# Patient Record
Sex: MALE | Race: WHITE
[De-identification: names, ages, dates, MRNs, and addresses within clinical notes are randomized per-mention and may not be internally consistent; named-entity substitution may affect disease eponyms.]

---

## 2017-12-04 ENCOUNTER — HOSPITAL ENCOUNTER (EMERGENCY)
Dept: HOSPITAL 80 - FED | Age: 27
Discharge: LEFT BEFORE DISCHARGE | End: 2017-12-04
Payer: SELF-PAY

## 2017-12-04 VITALS
HEART RATE: 78 BPM | OXYGEN SATURATION: 94 % | SYSTOLIC BLOOD PRESSURE: 158 MMHG | TEMPERATURE: 98.1 F | RESPIRATION RATE: 16 BRPM | DIASTOLIC BLOOD PRESSURE: 106 MMHG

## 2017-12-04 DIAGNOSIS — Z53.21: Primary | ICD-10-CM

## 2018-09-05 ENCOUNTER — HOSPITAL ENCOUNTER (EMERGENCY)
Dept: HOSPITAL 80 - FED | Age: 28
Discharge: HOME | End: 2018-09-05
Payer: COMMERCIAL

## 2018-09-05 VITALS — SYSTOLIC BLOOD PRESSURE: 129 MMHG | DIASTOLIC BLOOD PRESSURE: 88 MMHG

## 2018-09-05 DIAGNOSIS — F41.1: ICD-10-CM

## 2018-09-05 DIAGNOSIS — R11.10: Primary | ICD-10-CM

## 2018-09-05 DIAGNOSIS — F17.210: ICD-10-CM

## 2018-09-05 DIAGNOSIS — E86.9: ICD-10-CM

## 2018-09-05 LAB — PLATELET # BLD: 383 10^3/UL (ref 150–400)

## 2018-09-05 NOTE — EDPHY
General





- History


Smoking Status: Current every day smoker


Time Seen by Provider: 09/05/18 17:53


Narrative: 





CHIEF COMPLAINT: 


Body aches, anxious, vomiting





HISTORY OF PRESENT ILLNESS: 


Patient presents by private vehicle with complaints of body aches, anxious, 

vomiting.  Symptoms started 2 days ago.  He describes body aches and pain in 

"everyone single joint on my body."He feels very tired and has if he has no 

energy.  No chest pain.  Dry, nonproductive cough.  No headache.  No neck pain 

or stiffness.  No rash.  Also so she had with runny nose, sinus congestion.  He 

does have 5-7 episodes of vomiting day, no pain.  No bloody emesis.  No 

constipation, diarrhea or dark tarry stools.  No bright red blood in the 

stools.  No recent travel or known sick contacts.  Minimal improvement with over

-the-counter medications.  Worse with intake by mouth.  No other associated 

complaints or modifying factors





REVIEW OF SYSTEMS:


10 systems were reviewed and negative with the exception of the elements 

mentioned in the history of present illness.





PCP:


None





SPECIALISTS:


None





PAST MEDICAL HISTORY: 


Anxiety





PAST SURGICAL HISTORY:


No recent surgeries





SOCIAL HISTORY:


Daily smoker.  Occasional alcohol use.  Recently moved from [place].





FAMILY HISTORY:


Noncontributory





EXAMINATION:


General Appearance:  Alert, no distress


Head: normocephalic, atraumatic


Eyes:  Pupils equal and round, no conjunctival pallor or injection


ENT, Mouth:  Mucous membranes slightly dry.  Airway is widely patent with no 

trismus.  The posterior pharynx is unremarkable without exudate.


Neck:  Normal inspection, supple, non-tender


Respiratory:  Mild rhonchi.  No wheezing.  No retractions.  No crackles or 

diminishment.


Cardiovascular:  Regular rate and rhythm


Gastrointestinal:  Abdomen is soft and nontender.  No tympany rigidity.  No 

guarding.  No distention.


Back: non-tender, no bony abnormalities


Neurological:  A&O, nonfocal, normal gait


Skin:  Warm and dry, no rash no petechiae or purpura


Extremities:  Nontender, no pedal edema


Psychiatric:  Mood and affect normal





DIFFERENTIAL DIAGNOSES:


Including but not limited to acute anxiety reaction, dehydration, gastritis, 

pancreatitis, cholecystitis, cholelithiasis,








MDM:


6:00 p.m.


Nausea, vomiting, joint pain, body aches, accompanied by dry, nonproductive 

cough and anxiety.  Patient has mild tachycardic.  He is afebrile.  He is 

normotensive and does not appear to be toxic or acutely ill.  I have ordered IV 

fluid for the tachycardia, flu swab and laboratory studies further vomiting.  

He has a benign abdominal examination.  No meningismus.  Resting comfortably in 

no acute distress.





6:45 p.m.


Laboratories thus far negative.  IV fluid infusing.  Attempt p.o. Trial.





7:15 p.m.


Patient re-evaluated.  His vital signs are within normal limits.  He is feeling 

much better after IV fluid.  We discussed discharge home with hydroxyzine for 

anxiolysis.  We also discussed Zofran for nausea.  We discussed increase fluid 

intake and follow up with the on-call primary care physician to establish care.

  I have also provided the mental [facility] information for outpatient 

care for his anxiety.  We discussed ED precautions for any worsening symptoms, 

thoughts of self-harm, thoughts of harm towards others, chest pain or shortness 

of breath.  Discharged home stable condition.





SUPERVISION:


This patient was independently evaluated without direct involvement of or 

examination by the attending physician. 





CONSULTATION:


None


Primary care, [facility] referral


 (Vicente Orozco)


Discussion: 





The patient was evaluated and managed by the Physician Assistant.  My co-

signature indicates that I have reviewed this chart and I agree with the 

findings and plan of care as documented.  I am the secondary supervising 

physician. (Afsaneh Castro)





- Objective


Vital Signs: 





 Initial Vital Signs











Temperature (C)  36.8 C   09/05/18 16:50


 


Heart Rate  109 H  09/05/18 16:50


 


Respiratory Rate  16   09/05/18 16:50


 


Blood Pressure  150/106 H  09/05/18 16:50


 


O2 Sat (%)  99   09/05/18 16:50








 











O2 Delivery Mode               Room Air














Allergies/Adverse Reactions: 


 





NSAIDS Allergy (Intermediate, Uncoded 09/05/18 16:49)


 lips and genital swells








Home Medications: 














 Medication  Instructions  Recorded


 


Ondansetron Odt [Zofran Odt 4 mg 4 mg PO Q6 PRN #12 tab 09/05/18





(*)]  


 


hydrOXYzine HCL [Hydroxyzine HCl] 50 mg PO Q6-8PRN PRN #20 tablet 09/05/18











Laboratory Results: 





 Laboratory Results





 09/05/18 18:00 





 09/05/18 18:00 








Medications Given: 





 








Discontinued Medications





Sodium Chloride (Ns)  1,000 mls @ 0 mls/hr IV EDNOW ONE; Wide Open


   PRN Reason: Protocol


   Stop: 09/05/18 17:54


   Last Admin: 09/05/18 18:01 Dose:  1,000 mls


Ketorolac Tromethamine (Toradol)  30 mg IVP EDNOW ONE


   Stop: 09/05/18 18:38


   Last Admin: 09/05/18 19:04 Dose:  Not Given








Departure





- Departure


Disposition: Home, Routine, Self-Care


Clinical Impression: 


 Anxiety reaction, Acute vomiting





Condition: Good


Instructions:  Hydroxyzine (By mouth), Ondansetron (By mouth), Acute Nausea and 

Vomiting (ED), Anxiety (ED), Anxiolysis in Adults (ED)


Additional Instructions: 


1. Medication as prescribed as needed for the anxiety


2. Medication as prescribed as needed for nausea vomiting


3. Contact the on-call primary care physician as provided.  If you have 

difficulty obtaining an appointment, contact the case management RN here 


4. Contact [facility] to establish outpatient care for ongoing 

anxiolysis


5. ED precautions for any chest pain, shortness of breath, persistent vomiting,


 


Referrals: 


Lucien Beltran DO [Doctor of Osteopathy] - As per Instructions


[facility],. [Clinic] - As per Instructions


Prescriptions: 


hydrOXYzine HCL [Hydroxyzine HCl] 50 mg PO Q6-8PRN PRN #20 tablet


 PRN Reason: Anxiety


Ondansetron Odt [Zofran Odt 4 mg (*)] 4 mg PO Q6 PRN #12 tab


 PRN Reason: Nausea/Vomiting, Use 1st